# Patient Record
Sex: FEMALE | Race: BLACK OR AFRICAN AMERICAN | Employment: OTHER | URBAN - METROPOLITAN AREA
[De-identification: names, ages, dates, MRNs, and addresses within clinical notes are randomized per-mention and may not be internally consistent; named-entity substitution may affect disease eponyms.]

---

## 2019-08-28 ENCOUNTER — HOSPITAL ENCOUNTER (EMERGENCY)
Facility: CLINIC | Age: 84
Discharge: HOME | End: 2019-08-28
Attending: PHYSICIAN ASSISTANT | Admitting: PHYSICIAN ASSISTANT
Payer: MEDICARE

## 2019-08-28 VITALS
DIASTOLIC BLOOD PRESSURE: 94 MMHG | TEMPERATURE: 97.8 F | OXYGEN SATURATION: 98 % | RESPIRATION RATE: 16 BRPM | SYSTOLIC BLOOD PRESSURE: 138 MMHG

## 2019-08-28 DIAGNOSIS — Z87.09 HISTORY OF THROAT PROBLEM: ICD-10-CM

## 2019-08-28 PROCEDURE — 99282 EMERGENCY DEPT VISIT SF MDM: CPT

## 2019-08-28 ASSESSMENT — ENCOUNTER SYMPTOMS: FEVER: 0

## 2019-08-28 NOTE — ED PROVIDER NOTES
History     Chief Complaint:  Throat Problem    HPI   Alethea Woodward is a 86 year old female on Eliquis with history of PE who presents with a throat problem. Per chart review, the patient was seen in clinic at urgent care today for severe dryness of her throat, drainage into her throat, and a sharpness in her neck. Rapid strep was obtained and was negative. The patient tried to get an ENT appointement, but there were none available, so she came here instead for evaluation. Here, the patient reports dryness of her throat and like something is draining from her neck into the back of her throat, progressively worsening over the last 4 months, in which she coughs it up. She describes it as looking like white foam. She also notes a little pain with swallowing this morning, but had no trauma to this area. She states that she has tried cough drops for relief of her dry throat. The patient has seen her PCP for this for which she was prescribed a nasal spray. She denies ever having something like this before.  She denies difficulty swallowing, continued pain with swallowing, fevers, neck/facial swelling, and has no further concerns voiced at this time.    Allergies:  No known drug allergies    Medications:    Metoprolol succinate  Rosuvastatin  81 mg Aspirin  Amlodipine  Ipratropium bromide nasal solution  Zoloft  Eliquis    Past Medical History:    Type II diabetes mellitus  Anxiety state  Microscopic hematuria  Syncope and collapse  Hypertension  Premature ventricular contraction  Leiomyoma  Total knee replacement    Past Surgical History:    Total knee arthroplasty    Family History:    Cancer  Stroke    Social History:  Presents to the ED with family  Marital Status:        Review of Systems   Constitutional: Negative for fever.   HENT: Positive for postnasal drip.         Dry throat   All other systems reviewed and are negative.    Physical Exam     Patient Vitals for the past 24 hrs:   BP Temp Temp src  Heart Rate Resp SpO2   08/28/19 1623 (!) 138/94 97.8  F (36.6  C) Temporal 104 18 98 %   08/28/19 1622 -- -- -- -- 18 --       Physical Exam  General: Resting comfortably.  Alert and oriented.   Head:  The scalp, face, and head appear normal.  No neck or facial swelling.  Eyes:  Conjunctivae and sclerae are normal    ENT:    Uvula is in the midline     Structures are symmetric.    There is a small erythematous area in the left soft palate; no signs of ulceration.  No signs of obvious malignancy.     Bilateral TMs are normal without evidence of infection.    No obvious dental abnormality.  No obvious abscess or fluctuance.  Neck:  No lymphadenopathy  CV:  Regular rate and rhythm     Normal S1/S2  Resp:  Lungs are clear to auscultation    Non-labored    No rales or wheezing   MS:  Normal muscular tone   Skin:  No rash or acute skin lesions noted   Neuro: Speech is normal and fluent.       Emergency Department Course   Emergency Department Course:  Past medical records, nursing notes, and vitals reviewed.  1639: I performed an exam of the patient and obtained history, as documented above.    Patient discharged home with instructions regarding supportive care, medications, and reasons to return. The importance of close follow-up was reviewed.     Impression & Plan    Medical Decision Making:  Alethea Woodward is a 86 year old female on Eliquis with history of PE who presents with a throat problem.  The patient states that the  last 4 months she has been having trouble with throat dryness and increased mucus and has to cough up frequently.  She states that she does produce a lot of phlegm when doing this.  She states that she had a more sharp pain today, which prompted her evaluation to urgent care.  They tried to make an ENT appointment for her today, but they were unsuccessful.  They did, however, get an appointment 2 days with ENT. They suggested that she be seen here with hopes to see an ENT doctor tonight.  On  exam, there is no obvious anatomic abnormality however, the patient does have an erythematous area to the left soft palate.  There is no swelling or ulceration.  No obvious signs of malignancy.  Although, this is concerning it is hard to know if this is acute or whether this is been present. The patient states that she has not looked in her throat since this is happened.  I do not feel any emergent work-up needs to be obtained this time.  Again, the patient is afebrile and well-appearing.  She is tolerating her secretions.  She has no pain with swallowing or any difficulty swallowing food.  She is not drooling.  I do not feel lab work or imaging is indicated.  Overall, I believe the patient is safe for discharge home.  Patient does complain of throat dryness, and suggested Biotene.  Also considered allergies to cause postnasal drip and suggest the patient start Allegra.  She is asked to follow-up with her ENT as scheduled in 2 days.  She is otherwise asked to return immediately for any uncontrolled fevers, worsening pain, neck pains or neck swelling, facial swelling, fevers, or any other concerns.  All questions were answered prior to discharge.  The patient understands and agrees to this plan.      Diagnosis:    ICD-10-CM   1. History of throat problem Z87.09       Disposition: Discharged to home    I, Yaritza Jacobson, am serving as a scribe at 4:39 PM on 8/28/2019 to document services personally performed by Heather Johnson PA-C based on my observations and the provider's statements to me.     Yaritza Jacobson  8/28/2019   [facility] EMERGENCY DEPARTMENT       Heather Johnson PA-C  08/28/19 1918

## 2019-08-28 NOTE — DISCHARGE INSTRUCTIONS
Try Biotene mouthwash and use as directed. Also try using Allegra(fexofenadine) to help with symptoms. Follow up with the ENT doctor as scheduled on Friday.

## 2019-08-28 NOTE — ED TRIAGE NOTES
Patient presents to ED due to increased mucous back of throat for the past 4 months    Denies fever or pain

## 2022-06-16 NOTE — ED AVS SNAPSHOT
[facility] Emergency Department  [address]  [facility] [address]  Phone:  [phone]  Fax:  [phone]                                    Alethea Woodward   MRN: [id number]    Department:  [facility] Emergency Department   Date of Visit:  8/28/2019           After Visit Summary Signature Page    I have received my discharge instructions, and my questions have been answered. I have discussed any challenges I see with this plan with the nurse or doctor.    ..........................................................................................................................................  Patient/Patient Representative Signature      ..........................................................................................................................................  Patient Representative Print Name and Relationship to Patient    ..................................................               ................................................  Date                                   Time    ..........................................................................................................................................  Reviewed by Signature/Title    ...................................................              ..............................................  Date                                               Time          22EPIC Rev 08/18        Statement Selected